# Patient Record
Sex: MALE | Race: WHITE | NOT HISPANIC OR LATINO | URBAN - NONMETROPOLITAN AREA
[De-identification: names, ages, dates, MRNs, and addresses within clinical notes are randomized per-mention and may not be internally consistent; named-entity substitution may affect disease eponyms.]

---

## 2022-11-22 RX ORDER — CETIRIZINE HYDROCHLORIDE 10 MG/1
10 TABLET ORAL DAILY
COMMUNITY

## 2022-11-22 RX ORDER — SODIUM PHOSPHATE,MONO-DIBASIC 19G-7G/118
ENEMA (ML) RECTAL
COMMUNITY

## 2022-11-22 RX ORDER — IBUPROFEN 800 MG/1
800 TABLET ORAL EVERY 6 HOURS PRN
COMMUNITY

## 2022-12-04 PROBLEM — Z80.0 FAMILY HISTORY OF COLON CANCER: Status: ACTIVE | Noted: 2022-12-04

## 2022-12-04 NOTE — PROGRESS NOTES
Chief Complaint   Patient presents with   • Colonoscopy     Screening colon had colon about 5 years ago in [place] father had colon cancer       PCP: Julien Burnette PA  REFER: Julien Burnette PA    Subjective     HPI    Jefe Burdick is a 52 y.o. male who presents to office for preventative maintenance.  There is not  a personal history of colon polyps.  There is not a history of colon cancer.  He does not have complaints of nausea/vomiting, change in bowels, weight loss, no BRBPR, no melena.  There is (father) a family history of colon cancer in first degree relative-diagnosis age of early 60s.  There is not a family history of colon polyps in first degree relative.  Jefe Burdick last colonoscopy-apx 5 years ago .  Bowels do move on regular basis.        No results for input(s): GLUCOSE, NA, K, CREATININE, BUN, BCR, ALKPHOS, ALT, AST, BILITOT, ALBUMIN in the last 64584 hours.    No results for input(s): EGFRIFNONA, EGFRIFAFRI, EGFRRESULT in the last 70081 hours.     Past Medical History:   Diagnosis Date   • Hyperlipidemia      History reviewed. No pertinent surgical history.  Outpatient Medications Marked as Taking for the 12/5/22 encounter (Office Visit) with Jesse Soriano APRN   Medication Sig Dispense Refill   • cetirizine (zyrTEC) 10 MG tablet Take 10 mg by mouth Daily.       No Known Allergies  Social History     Socioeconomic History   • Marital status:    Tobacco Use   • Smoking status: Never   • Smokeless tobacco: Never   Vaping Use   • Vaping Use: Never used   Substance and Sexual Activity   • Alcohol use: Yes     Comment: occ.   • Drug use: Never     Review of Systems   Constitutional: Negative for unexpected weight change.   Respiratory: Negative for shortness of breath.    Cardiovascular: Negative for chest pain.   Gastrointestinal: Negative for abdominal pain and anal bleeding.     Objective   Vitals:    12/05/22 1026   BP: 134/84   Pulse: 88   Temp: 97 °F (36.1 °C)    SpO2: 98%     Physical Exam  Constitutional:       Appearance: Normal appearance. He is well-developed.   Eyes:      General: No scleral icterus.  Cardiovascular:      Rate and Rhythm: Regular rhythm.      Heart sounds: Normal heart sounds. No murmur heard.  Pulmonary:      Effort: Pulmonary effort is normal. No accessory muscle usage.      Breath sounds: Normal breath sounds.   Abdominal:      General: Bowel sounds are normal. There is no distension.      Palpations: Abdomen is soft. There is no mass.      Tenderness: There is no abdominal tenderness. There is no guarding or rebound.   Skin:     General: Skin is warm and dry.      Coloration: Skin is not jaundiced.   Neurological:      Mental Status: He is alert.   Psychiatric:         Behavior: Behavior is cooperative.       Imaging Results (Most Recent)     None        Body mass index is 36.72 kg/m².    Assessment & Plan   Diagnoses and all orders for this visit:    1. Family history of colon cancer (Primary)  Comments:  father  Orders:  -     Case Request; Standing  -     Implement Anesthesia Orders Day of Procedure; Standing  -     Obtain Informed Consent; Standing  -     Case Request    Other orders  -     polyethylene glycol (MIRALAX) 17 GM/SCOOP powder; Take 238 g by mouth 1 (One) Time for 1 dose. Take as directed  Dispense: 238 g; Refill: 0      COLONOSCOPY WITH ANESTHESIA (N/A)        Advised pt to stop use of NSAIDs, Fish Oil, and MV 5 days prior to procedure, per Dr Fregoso protocol.  Tylenol based products are ok to take.  Pt verbalized understanding.     All risks, benefits, alternatives, and indications of colonoscopy procedure have been discussed with the patient. Risks to include perforation of the colon requiring possible surgery or colostomy, risk of bleeding from biopsies or removal of colon tissue, possibility of missing a colon polyp or cancer, or adverse drug reaction.  Benefits to include the diagnosis and management of disease of the colon  and rectum. Alternatives to include barium enema, radiographic evaluation, lab testing or no intervention. He verbalizes understanding and agrees.         Jesse Soriano, APRN  12/05/22        There are no Patient Instructions on file for this visit.

## 2022-12-05 ENCOUNTER — OFFICE VISIT (OUTPATIENT)
Dept: GASTROENTEROLOGY | Facility: CLINIC | Age: 52
End: 2022-12-05

## 2022-12-05 VITALS
HEART RATE: 88 BPM | HEIGHT: 74 IN | DIASTOLIC BLOOD PRESSURE: 84 MMHG | SYSTOLIC BLOOD PRESSURE: 134 MMHG | WEIGHT: 286 LBS | BODY MASS INDEX: 36.7 KG/M2 | TEMPERATURE: 97 F | OXYGEN SATURATION: 98 %

## 2022-12-05 DIAGNOSIS — Z80.0 FAMILY HISTORY OF COLON CANCER: Primary | ICD-10-CM

## 2022-12-05 PROCEDURE — S0260 H&P FOR SURGERY: HCPCS | Performed by: NURSE PRACTITIONER

## 2022-12-05 RX ORDER — POLYETHYLENE GLYCOL 3350 17 G/17G
238 POWDER, FOR SOLUTION ORAL ONCE
Qty: 238 G | Refills: 0 | Status: SHIPPED | OUTPATIENT
Start: 2022-12-05 | End: 2022-12-05

## 2023-01-27 ENCOUNTER — ANESTHESIA (OUTPATIENT)
Dept: GASTROENTEROLOGY | Facility: HOSPITAL | Age: 53
End: 2023-01-27
Payer: OTHER GOVERNMENT

## 2023-01-27 ENCOUNTER — HOSPITAL ENCOUNTER (OUTPATIENT)
Facility: HOSPITAL | Age: 53
Setting detail: HOSPITAL OUTPATIENT SURGERY
Discharge: HOME | End: 2023-01-27
Attending: INTERNAL MEDICINE | Admitting: INTERNAL MEDICINE
Payer: OTHER GOVERNMENT

## 2023-01-27 ENCOUNTER — ANESTHESIA EVENT (OUTPATIENT)
Dept: GASTROENTEROLOGY | Facility: HOSPITAL | Age: 53
End: 2023-01-27
Payer: OTHER GOVERNMENT

## 2023-01-27 VITALS
HEART RATE: 80 BPM | TEMPERATURE: 97.7 F | OXYGEN SATURATION: 96 % | BODY MASS INDEX: 35.94 KG/M2 | WEIGHT: 280 LBS | HEIGHT: 74 IN | RESPIRATION RATE: 16 BRPM | SYSTOLIC BLOOD PRESSURE: 140 MMHG | DIASTOLIC BLOOD PRESSURE: 90 MMHG

## 2023-01-27 DIAGNOSIS — Z80.0 FAMILY HISTORY OF COLON CANCER: ICD-10-CM

## 2023-01-27 PROCEDURE — 45378 DIAGNOSTIC COLONOSCOPY: CPT | Performed by: INTERNAL MEDICINE

## 2023-01-27 PROCEDURE — 25010000002 PROPOFOL 10 MG/ML EMULSION: Performed by: NURSE ANESTHETIST, CERTIFIED REGISTERED

## 2023-01-27 RX ORDER — LIDOCAINE HYDROCHLORIDE 10 MG/ML
0.5 INJECTION, SOLUTION EPIDURAL; INFILTRATION; INTRACAUDAL; PERINEURAL ONCE AS NEEDED
Status: DISCONTINUED | OUTPATIENT
Start: 2023-01-27 | End: 2023-01-27 | Stop reason: HOSPADM

## 2023-01-27 RX ORDER — PROPOFOL 10 MG/ML
VIAL (ML) INTRAVENOUS AS NEEDED
Status: DISCONTINUED | OUTPATIENT
Start: 2023-01-27 | End: 2023-01-27 | Stop reason: SURG

## 2023-01-27 RX ORDER — LIDOCAINE HYDROCHLORIDE 20 MG/ML
INJECTION, SOLUTION EPIDURAL; INFILTRATION; INTRACAUDAL; PERINEURAL AS NEEDED
Status: DISCONTINUED | OUTPATIENT
Start: 2023-01-27 | End: 2023-01-27 | Stop reason: SURG

## 2023-01-27 RX ORDER — SODIUM CHLORIDE 9 MG/ML
500 INJECTION, SOLUTION INTRAVENOUS CONTINUOUS PRN
Status: DISCONTINUED | OUTPATIENT
Start: 2023-01-27 | End: 2023-01-27 | Stop reason: HOSPADM

## 2023-01-27 RX ORDER — SODIUM CHLORIDE 0.9 % (FLUSH) 0.9 %
10 SYRINGE (ML) INJECTION AS NEEDED
Status: DISCONTINUED | OUTPATIENT
Start: 2023-01-27 | End: 2023-01-27 | Stop reason: HOSPADM

## 2023-01-27 RX ADMIN — LIDOCAINE HYDROCHLORIDE 50 MG: 20 INJECTION, SOLUTION EPIDURAL; INFILTRATION; INTRACAUDAL; PERINEURAL at 10:40

## 2023-01-27 RX ADMIN — PROPOFOL INJECTABLE EMULSION 250 MG: 10 INJECTION, EMULSION INTRAVENOUS at 10:40

## 2023-01-27 RX ADMIN — SODIUM CHLORIDE 500 ML: 9 INJECTION, SOLUTION INTRAVENOUS at 09:29

## 2023-01-27 NOTE — ANESTHESIA POSTPROCEDURE EVALUATION
"Patient: Jefe Burdick    Procedure Summary     Date: 01/27/23 Room / Location: [facility] ENDOSCOPY 5 / [facility] ENDOSCOPY    Anesthesia Start: 1037 Anesthesia Stop: 1055    Procedure: COLONOSCOPY WITH ANESTHESIA Diagnosis:       Family history of colon cancer      (Family history of colon cancer [Z80.0])    Surgeons: Donis Fregoso DO Provider: Pacheco Treviño CRNA    Anesthesia Type: MAC ASA Status: 2          Anesthesia Type: MAC    Vitals  Vitals Value Taken Time   /90 01/27/23 1057   Temp     Pulse 93 01/27/23 1058   Resp 18 01/27/23 1055   SpO2 96 % 01/27/23 1058   Vitals shown include unvalidated device data.        Post Anesthesia Care and Evaluation    Patient location during evaluation: PACU  Patient participation: complete - patient participated  Level of consciousness: awake and alert  Pain management: adequate    Airway patency: patent  Anesthetic complications: No anesthetic complications    Cardiovascular status: acceptable  Respiratory status: acceptable  Hydration status: acceptable    Comments: Blood pressure 145/87, pulse 94, temperature 97.7 °F (36.5 °C), temperature source Temporal, resp. rate 18, height 188 cm (74\"), weight 127 kg (280 lb), SpO2 96 %.    Pt discharged from PACU based on sarah score >8      "

## 2023-01-27 NOTE — ANESTHESIA PREPROCEDURE EVALUATION
Anesthesia Evaluation     Patient summary reviewed   no history of anesthetic complications:  NPO Solid Status: > 8 hours             Airway   Mallampati: II  TM distance: >3 FB  Neck ROM: full  No difficulty expected  Dental - normal exam     Pulmonary - negative pulmonary ROS   Cardiovascular - negative cardio ROS  Exercise tolerance: excellent (>7 METS)        Neuro/Psych- negative ROS  GI/Hepatic/Renal/Endo    (+) obesity,       Musculoskeletal (-) negative ROS    Abdominal    Substance History      OB/GYN          Other - negative ROS                       Anesthesia Plan    ASA 2     MAC     intravenous induction     Anesthetic plan, risks, benefits, and alternatives have been provided, discussed and informed consent has been obtained with: patient.        CODE STATUS:

## 2023-01-30 ENCOUNTER — TELEPHONE (OUTPATIENT)
Dept: GASTROENTEROLOGY | Facility: CLINIC | Age: 53
End: 2023-01-30
Payer: OTHER GOVERNMENT

## 2024-10-31 ENCOUNTER — TRANSCRIBE ORDERS (OUTPATIENT)
Dept: ADMINISTRATIVE | Facility: HOSPITAL | Age: 54
End: 2024-10-31
Payer: OTHER GOVERNMENT

## 2024-10-31 DIAGNOSIS — J30.9 ALLERGIC RHINITIS, UNSPECIFIED SEASONALITY, UNSPECIFIED TRIGGER: Primary | ICD-10-CM

## 2024-11-20 ENCOUNTER — HOSPITAL ENCOUNTER (OUTPATIENT)
Dept: GENERAL RADIOLOGY | Facility: HOSPITAL | Age: 54
Discharge: HOME | End: 2024-11-20
Payer: OTHER GOVERNMENT

## 2024-11-20 DIAGNOSIS — J30.9 ALLERGIC RHINITIS, UNSPECIFIED SEASONALITY, UNSPECIFIED TRIGGER: ICD-10-CM

## 2024-11-20 PROCEDURE — 73110 X-RAY EXAM OF WRIST: CPT

## 2024-11-20 PROCEDURE — 70150 X-RAY EXAM OF FACIAL BONES: CPT

## 2025-05-15 ENCOUNTER — OFFICE VISIT (OUTPATIENT)
Age: 55
End: 2025-05-15
Payer: OTHER GOVERNMENT

## 2025-05-15 VITALS — WEIGHT: 280 LBS | BODY MASS INDEX: 35.94 KG/M2 | HEIGHT: 74 IN

## 2025-05-15 DIAGNOSIS — M67.431 GANGLION CYST OF WRIST, RIGHT: Primary | ICD-10-CM

## 2025-05-15 RX ORDER — FLUTICASONE PROPIONATE 50 MCG
2 SPRAY, SUSPENSION (ML) NASAL DAILY
COMMUNITY

## 2025-05-15 NOTE — PROGRESS NOTES
[facility] Sports Medicine  Tommy King MD, PhD  Chano King PA-C    Chief Complaint:   Chief Complaint   Patient presents with    Right Wrist - Initial Evaluation     Patient presents today for right wrist pain and ganglion cyst. X-rays performed at [facility] on 11/20/2024. Pt states the ganglion cyst has been present for 6 or more months. Pt states it has continuously gotten bigger with time. Pt states there was no wrist pain prior to the cyst.           History of Present Illness:   The patient is a pleasant 54-year-old who presents with a mildly painful cyst involving the volar surface of his right wrist.  This been ongoing since around November 2024.  He states that symptoms come and go.  He does state that it seems to have improved more recently.  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  He had x-rays taken back in November which did not show any acute osseous abnormality and did not show any soft tissue findings.  He has not had any advanced imaging as of yet.  He states that it is smaller than it typically is today.    Referring Provider: Julien Burnette PA     Past Medical History:   Past Medical History:   Diagnosis Date    GERD (gastroesophageal reflux disease)     HL (hearing loss)     Hyperlipidemia         Past Surgical History:  Past Surgical History:   Procedure Laterality Date    ABDOMINAL SURGERY      infant    COLONOSCOPY      COLONOSCOPY N/A 1/27/2023    Procedure: COLONOSCOPY WITH ANESTHESIA;  Surgeon: Donis Fregoso DO;  Location: [facility] ENDOSCOPY;  Service: Gastroenterology;  Laterality: N/A;  Pre: Family history of colon cancer  Post: normal  Julien Burnette PA        Social History:   Social History     Socioeconomic History    Marital status:    Tobacco Use    Smoking status: Never    Smokeless tobacco: Never   Vaping Use    Vaping status: Never Used    Substance and Sexual Activity    Alcohol use: Yes     Comment: Only two drinks a month, rarely.    Drug use: Never    Sexual activity: Defer        Medications:  Current Outpatient Medications   Medication Instructions    cetirizine (ZYRTEC) 10 mg, Daily    fluticasone (FLONASE) 50 MCG/ACT nasal spray 2 sprays, Daily    glucosamine-chondroitin 500-400 MG capsule capsule 3 Times Daily With Meals    ibuprofen (ADVIL,MOTRIN) 800 mg, Every 6 Hours PRN    Probiotic Product (PROBIOTIC-10 PO) Take  by mouth.        Allergies:  No Known Allergies    Vital Signs:  There were no vitals filed for this visit.  Body mass index is 35.93 kg/m².     Review of Systems:   Review of Systems    Physical Exam:  Vital signs reviewed.   General: No acute distress. Cooperative with exam.   Eyes: conjunctiva clear; pupils equally round and reactive  ENT: external ears and nose atraumatic; oropharynx clear  CV: no peripheral edema, 2+ distal pulses  Resp: normal respiratory effort, no use of accessory muscles  Skin: no rashes or wounds; normal turgor  Psych: mood and affect appropriate; recent and remote memory intact  Neuro: sensation to light touch intact, no focal neurological deficit  Musculoskeletal: On inspection of the patient's right wrist, there is a small palpable, mobile, nontender cyst over the volar surface just superficial to the radial artery.  Pulses 2+, capillary refills less than 2.  He has intact range of motion at the wrist actively and passively that is pain-free.   strength is intact, 5 out of 5.  Neurovascular intact    Physical Exam     Results Review:   I have personally reviewed and interpreted an AP oblique and lateral of the patient's right wrist which did not demonstrate any acute osseous abnormality.  There is minimal degenerative change.  No significant soft tissue abnormalities are present.  No calcified soft tissue lesions are present.  Image quality is adequate    Assessment:   Diagnoses and all orders  for this visit:    1. Ganglion cyst of wrist, right (Primary)         Plan:  The patient has what appears to be a very small ganglion cyst just superficial to the radial artery on the volar surface of his right wrist.  We discussed primarily close observation and use of NSAIDs if it happens to increase in size.  We have also discussed the possibility of aspiration under ultrasound guidance given the proximity to the radial artery.  Finally we have also discussed the possibility of obtaining an MRI of the right wrist to ensure that this is in fact a ganglion cyst and may consider referral to orthopedic hand and wrist surgery if necessary in the future.  The patient is agreeable with this plan, all questions were answered.    Follow-Up:   No follow-ups on file.     Procedure:  Procedures       This document has been signed by VINH Rosado on May 15, 2025 09:16 CDT

## 2025-06-18 ENCOUNTER — OFFICE VISIT (OUTPATIENT)
Dept: OTOLARYNGOLOGY | Facility: CLINIC | Age: 55
End: 2025-06-18
Payer: OTHER GOVERNMENT

## 2025-06-18 VITALS
HEIGHT: 74 IN | WEIGHT: 280.6 LBS | BODY MASS INDEX: 36.01 KG/M2 | DIASTOLIC BLOOD PRESSURE: 90 MMHG | TEMPERATURE: 97.7 F | HEART RATE: 89 BPM | SYSTOLIC BLOOD PRESSURE: 168 MMHG

## 2025-06-18 DIAGNOSIS — J31.0 CHRONIC RHINITIS: Primary | ICD-10-CM

## 2025-06-18 DIAGNOSIS — J34.89 NASAL OBSTRUCTION: ICD-10-CM

## 2025-06-18 DIAGNOSIS — R44.8 FACIAL PRESSURE: ICD-10-CM

## 2025-06-18 DIAGNOSIS — J34.2 NASAL SEPTAL DEVIATION: ICD-10-CM

## 2025-06-18 RX ORDER — FLUTICASONE PROPIONATE 50 MCG
2 SPRAY, SUSPENSION (ML) NASAL DAILY
Qty: 16 G | Refills: 2 | Status: SHIPPED | OUTPATIENT
Start: 2025-06-18

## 2025-06-18 RX ORDER — AZELASTINE 1 MG/ML
2 SPRAY, METERED NASAL 2 TIMES DAILY
Qty: 30 ML | Refills: 2 | Status: SHIPPED | OUTPATIENT
Start: 2025-06-18

## 2025-06-18 NOTE — PROGRESS NOTES
"    DOLORES Patricio  W ENT [facility]SE [facility]  [address], [address]  [facility] [address]  Fax [phone]  Phone [phone]      Visit Type: NEW PATIENT   Chief Complaint   Patient presents with    Allergic Rhinitis     Inflammation of the nasal passage due to an allergic reaction: [facility] says that [facility] is causing him issues to his eyes and ears. The meds that he believes is causing these symptoms are Azelastine, Montelukast 10 mg, and Flonase since November.           HISTORY OBTAINED FROM: patient  Allergic Rhinitis    Jefe Burdick is a 54 y.o. male who presents for evaluation of sinus complaints. He states he has had issues for years. [facility]. He reports he has episodes of chronic rhinitis with nasal congestion and drainage. Has \"flair ups\" with increased congestion and drainage, believes they are sinus infections and has facial pressure, blurred vision and fatigue with episodes. States they occur every few months, seems to be triggered when he misses medication.   He is currently on Astelin, Singulair, and Flonase.   Does feel these help symptoms but complains that they dry him out.   He states he is usually not treated with antibiotics or steroids, states he usually just rides symptoms out and gradually improves.         Past Medical History:   Diagnosis Date    GERD (gastroesophageal reflux disease)     HL (hearing loss)     Hyperlipidemia        Past Surgical History:   Procedure Laterality Date    ABDOMINAL SURGERY      infant    COLONOSCOPY      COLONOSCOPY N/A 1/27/2023    Procedure: COLONOSCOPY WITH ANESTHESIA;  Surgeon: Donis Fregoso DO;  Location: [facility] ENDOSCOPY;  Service: Gastroenterology;  Laterality: N/A;  Pre: Family history of colon cancer  Post: normal  Julien Burnette PA       Family History: His family history includes Cancer in his father; Colon " cancer in his father; Diabetes in his father and mother; Heart disease in his father; Hyperlipidemia in his father; Hypertension in his father and mother.     Social History: He  reports that he has never smoked. He has never used smokeless tobacco. He reports current alcohol use. He reports that he does not use drugs.    Home Medications:  Probiotic Product, cetirizine, fluticasone, glucosamine-chondroitin, and ibuprofen    Allergies:  He has no known allergies.       Vital Signs:   Temp:  [97.7 °F (36.5 °C)] 97.7 °F (36.5 °C)  Heart Rate:  [89] 89  BP: (168)/(90) 168/90  ENT Physical Exam  Constitutional  Appearance: patient appears well-developed, well-nourished and well-groomed,  Communication/Voice: communication appropriate for developmental age; vocal quality normal;  Head and Face  Appearance: head appears normal, face appears normal and face appears atraumatic;  Palpation: maxillary sinus tenderness noted bilaterally; Sinus comments: mild tenderness  Salivary: glands normal;  Ear  Hearing: intact;  Auricles: bilateral auricles normal;  External Mastoids: bilateral external mastoids normal;  Ear Canals: bilateral ear canals normal;  Tympanic Membranes: bilateral tympanic membranes normal;  Nose  External Nose: nares patent bilaterally; external nose normal;  Internal Nose: nasal mucosa normal; bilateral inferior turbinates normal;  Oral Cavity/Oropharynx  Lips: normal;  Teeth: normal;  Gums: gingiva normal;  Tongue: normal; Benavides III position;  Oral mucosa: normal;  Hard palate: normal;  Soft palate: normal;  Tonsils: normal;  Base of Tongue: normal;  Posterior pharyngeal wall: normal;  Neck  Neck: neck normal; neck palpation normal;  Respiratory  Inspection: breathing unlabored; normal breathing rate;  Cardiovascular  Inspection: extremities are warm and well perfused;  Neurovestibular  Mental Status: alert and oriented;       Nasal Endoscopy    Date/Time: 6/18/2025 3:38 PM    Performed by: Sudarshan Rodas  DOLORES DOBBS  Authorized by: Sudarshan Rodas APRN    Consent:     Consent obtained:  Verbal    Consent given by:  Patient    Risks discussed:  Infection, nerve damage, bleeding and pain    Alternatives discussed:  No treatment, delayed treatment, observation and alternative treatment  Universal protocol:     Procedure explained and questions answered to patient or proxy's satisfaction: yes      Immediately prior to procedure, a time out was called: yes      Patient identity confirmed:  Verbally with patient  Anesthesia (see MAR for exact dosages):     Anesthesia method:  Topical application    Topical anesthetic:  Tetracaine  Procedure details:     Indications: sino-nasal symptoms      Medication:  Afrin    Instrument: flexible nasal nasal endoscope      Scope location: bilateral nare    Nasal cavity:     Right inferior turbinates: edema and enlarged      Left inferior turbinates: edema and enlarged    Septum:     Findings: inferior thickening      Deviation: deviated to the right and deviated to the left      Spurs: right and left      Spurs comment:  Inferior right spurring, mid left spurring  Sinus/ Nasopharynx:     Right middle meatus: normal and patent      Left middle meatus: obstruction       comment:  Anatomic obstruction due to septal deviation and turbinates    inflammation       comment:  Questionable small cyst to right side nasopharynx    inflammation      patent      inflammation      patent      inflammation    Post-procedure details:     Patient tolerance of procedure:  Tolerated well         Result Review       RESULTS REVIEW    I have reviewed the patients old records in the chart.   The following results/records were reviewed:     REFERRAL/PRE-AUTH MRN - SCAN - [facility] REF/AUTH_VINH PAL_05/08/25 (05/23/2025)     EXTERNAL MEDICAL RECORDS - SCAN - RECORD REQUEST, [facility], 5.22.2025 (05/22/2025)            Assessment & Plan  Chronic rhinitis    Orders:    Nasal endoscopy    CT Sinus Without  Contrast    Facial pressure    Orders:    Nasal endoscopy    CT Sinus Without Contrast    Nasal septal deviation    Orders:    Nasal endoscopy    CT Sinus Without Contrast    Nasal obstruction    Orders:    Nasal endoscopy    CT Sinus Without Contrast       Patient has notable septal deviation and turbinate enlargement, partial nasal obstruction especially notable to the left middle meatus.  This may be causing some of his symptoms.  Will pursue CT scan for further evaluation, likely will hold surgical discussion at follow-up.  Advised visual disturbances are usually not associated with sinus complaints.  I also advised patient to follow-up with his eye doctor and primary care provider for further evaluation of these complaints.    Medical and surgical options were discussed including observation, continued medical management, medication modification, surgical management, and CT scanning. Risks, benefits and alternatives were discussed and questions were answered. After considering the options, the patient decided to proceed with medication modification and CT scanning.  Saline nasal spray or saline gel to nose three times a day and as needed. Use a bedside humidifier at night. Place Vasoline in nose in the morning and at night.  Place Vasoline in nose in the morning and at night on the center part of the nose (septum) at least 15 minutes prior to the nasal spray. When using the nasal spray, aim towards the outer corner of the eye.   For the best response, use your nasal sprays every day without skipping doses. It may take several weeks before the full effect is acheived.   GENERAL ALLERGY AVOIDANCE: Regular vacuum cleaning is  recommended  to prevent accumulation of allergens. Use adequate filtration, including double thickness bags or HEPA filters on the  outlet. Use air-conditioning where possible. Change central air filters with an allergy rated filter on a regular basis. Install car pollen filters where  possible.   Obtain sinus CT, will call results  Increase Flonase to twice daily  Increase Astelin to twice daily   saline spray  Vaseline to nose  Bedside humidifier    Call with questions or concerns    Return in about 6 weeks (around 7/30/2025) for Recheck sinus complaints, review CT scan.        Electronically signed by DOLORES Patricio 06/18/25 4:25 PM CDT.

## 2025-07-10 ENCOUNTER — HOSPITAL ENCOUNTER (OUTPATIENT)
Dept: CT IMAGING | Facility: HOSPITAL | Age: 55
Discharge: HOME | End: 2025-07-10
Admitting: NURSE PRACTITIONER
Payer: OTHER GOVERNMENT

## 2025-07-10 PROCEDURE — 70486 CT MAXILLOFACIAL W/O DYE: CPT

## 2025-07-11 ENCOUNTER — RESULTS FOLLOW-UP (OUTPATIENT)
Dept: OTOLARYNGOLOGY | Facility: CLINIC | Age: 55
End: 2025-07-11
Payer: OTHER GOVERNMENT

## 2025-07-11 NOTE — TELEPHONE ENCOUNTER
Patient notified of CT findings per DOLORES Foote: Some mild chronic sinusitis in the left ethmoid, left maxillary and left frontal. Mild thickening of both ostiomeatal units but no significant obstruction. Mild deviation of the septum and some spurring bilaterally. Continue conservative treatment with nasal sprays for now, follow-up as scheduled and we will review further follow-up, plan for likely repeat nasal endoscopy.   Patient verbalized understanding.

## 2025-07-30 ENCOUNTER — OFFICE VISIT (OUTPATIENT)
Dept: OTOLARYNGOLOGY | Facility: CLINIC | Age: 55
End: 2025-07-30
Payer: OTHER GOVERNMENT

## 2025-07-30 VITALS
DIASTOLIC BLOOD PRESSURE: 91 MMHG | HEART RATE: 74 BPM | HEIGHT: 74 IN | TEMPERATURE: 98 F | BODY MASS INDEX: 36.06 KG/M2 | WEIGHT: 281 LBS | SYSTOLIC BLOOD PRESSURE: 164 MMHG

## 2025-07-30 DIAGNOSIS — J31.0 CHRONIC RHINITIS: Primary | ICD-10-CM

## 2025-07-30 DIAGNOSIS — R44.8 FACIAL PRESSURE: ICD-10-CM

## 2025-07-30 DIAGNOSIS — J34.2 NASAL SEPTAL DEVIATION: ICD-10-CM

## 2025-07-30 DIAGNOSIS — J34.89 NASAL OBSTRUCTION: ICD-10-CM

## 2025-07-30 NOTE — PROGRESS NOTES
"    DOLORES Patricio  [facility]  [address], [address]  [facility] [address]  Fax [phone]  Phone [phone]      Visit Type: FOLLOW UP   Chief Complaint   Patient presents with    Follow-up     Rechecking sinus complaints and reviewing CT report: Pt says he has been feeling much better since his last visit.           HISTORY OBTAINED FROM: patient  HPI  Jefe Burdick is a 55 y.o. male who presents for valuation, recheck sinonasal complaints. He states he has had issues for years. [facility] He reports he has episodes of chronic rhinitis with nasal congestion and drainage. Has \"flair ups\" with increased congestion and drainage, believes they are sinus infections and has facial pressure, blurred vision and fatigue with episodes. States they occur every few months, seems to be triggered when he misses medication.     Patient had CT scan of the sinuses after last visit, reviewed in office today.  He reports in general he has had good improvement in symptoms. He has been using Astelin and feels that this has helped his symptoms overall.  He reports no significant issues with congestion or drainage, feels he is breathing through his nose much better.  Denies notable facial pressure or other complaints today.      Past Medical History:   Diagnosis Date    GERD (gastroesophageal reflux disease)     HL (hearing loss)     Hyperlipidemia        Past Surgical History:   Procedure Laterality Date    ABDOMINAL SURGERY      infant    COLONOSCOPY      COLONOSCOPY N/A 1/27/2023    Procedure: COLONOSCOPY WITH ANESTHESIA;  Surgeon: Donis Fregoso DO;  Location: [facility] ENDOSCOPY;  Service: Gastroenterology;  Laterality: N/A;  Pre: Family history of colon cancer  Post: normal  Julien Burnette PA       Family History: His family history includes Cancer in his father; Colon cancer in his father; Diabetes in his father and " mother; Heart disease in his father; Hyperlipidemia in his father; Hypertension in his father and mother.     Social History: He  reports that he has never smoked. He has never used smokeless tobacco. He reports current alcohol use. He reports that he does not use drugs.    Home Medications:  Probiotic Product, azelastine, cetirizine, fluticasone, glucosamine-chondroitin, and ibuprofen    Allergies:  He has no known allergies.       Vital Signs:   Temp:  [98 °F (36.7 °C)] 98 °F (36.7 °C)  Heart Rate:  [74] 74  BP: (164)/(91) 164/91  ENT Physical Exam  Constitutional  Appearance: patient appears well-developed, well-nourished and well-groomed,  Communication/Voice: communication appropriate for developmental age; vocal quality normal;  Head and Face  Appearance: head appears normal, face appears normal and face appears atraumatic;  Palpation: maxillary sinus tenderness noted bilaterally; Sinus comments: mild tenderness  Salivary: glands normal;  Ear  Hearing: intact;  Auricles: bilateral auricles normal;  External Mastoids: bilateral external mastoids normal;  Ear Canals: bilateral ear canals normal;  Tympanic Membranes: bilateral tympanic membranes normal;  Nose  External Nose: nares patent bilaterally; external nose normal;  Internal Nose: nasal mucosa normal; bilateral inferior turbinates normal;  Oral Cavity/Oropharynx  Lips: normal;  Teeth: normal;  Gums: gingiva normal;  Tongue: normal; Benavides III position;  Oral mucosa: normal;  Hard palate: normal;  Soft palate: normal;  Tonsils: normal;  Base of Tongue: normal;  Posterior pharyngeal wall: normal;  Neck  Neck: neck normal; neck palpation normal;  Respiratory  Inspection: breathing unlabored; normal breathing rate;  Cardiovascular  Inspection: extremities are warm and well perfused;  Neurovestibular  Mental Status: alert and oriented;           Result Review       RESULTS REVIEW    I have reviewed the patients old records in the chart.   The following  results/records were reviewed:     CT Sinus Without Contrast (07/10/2025 10:25)     Review of bone windows demonstrates normal aeration of the mastoid air  cells, with asymmetry and evidence of mild mastoid sclerosis on the  right. There is mild mucosal thickening in the inferior left maxillary  sinus with retained mucus. The right maxillary sinus is normally  aerated. The sphenoid sinus is normally aerated. There are several  opacified ethmoid air cells on the left and one on the right. The right  frontal sinus is hypoplastic, and developed. Small amount of mucoid  debris is noted within the base of the left frontal sinus. No sinus  air-fluid levels are identified. There is mild deviation of the nasal  septum to the right. There is also a bony spur of the posterior left and  right anterior nasal septum. The ostiomeatal complexes appear  morphologically normal except for small bilateral Ashley cells. There is  mild mucosal thickening at the infundibula bilaterally. The orbits and  contents are within normal limits. The mandible and TMJs are  unremarkable. No skull abnormality. The visualized cervical spine is  unremarkable.     IMPRESSION:  1. Evidence of mild chronic sinusitis involving left ethmoid sinuses,  left maxillary sinus, base of the left frontal sinus and there is one  opacified right ethmoid air cells. There is also mild mucosal thickening  within the infundibula of both OMU's, without obstruction. No air-fluid  levels to indicate acute sinusitis.  2. Mild deviation of the nasal septum to the right and there are small  spurs involving the inferior nasal septum on the right and left  posteriorly. Small Ashley cells are present.        This report was signed and finalized on 7/10/2025 11:09 AM by Dr. William Ma MD.           Assessment & Plan  Chronic rhinitis         Facial pressure         Nasal septal deviation         Nasal obstruction            Reviewed CT scan in office with patient, reveals  somewhat S-shaped deviation of the septum with some rightward spurring as expected, mild inferior turbinate hypertrophy, some mild to moderate mucosal thickening in the left maxillary left frontal and left ethmoid air cells.  Ongoing treatment options discussed with patient including surgical intervention versus continued conservative measures or allergy testing.  Overall he reports he has improved fairly well and is happy with progress on nasal sprays.  He wishes to continue these for now.  I did discuss repeat nasal endoscopy today but he declines, states he feels much better and wishes to continue with current treatment plan.  May reconsider at follow-up.  Plan to recheck him again in about 3 to 4 months is moving to cold flu season and through the fall weather change.  He will call with any questions concerns or develop any symptoms.    Medical and surgical options were discussed including observation, continued medical management, medication modification, surgical management, and allergy testing. Risks, benefits and alternatives were discussed and questions were answered. After considering the options, the patient decided to proceed with continued medical management.  Continue nasal sprays as previously prescribed.  Saline nasal spray or saline gel to nose three times a day and as needed. Use a bedside humidifier at night. Place Vasoline in nose in the morning and at night.  Place Vasoline in nose in the morning and at night on the center part of the nose (septum) at least 15 minutes prior to the nasal spray. When using the nasal spray, aim towards the outer corner of the eye.   GENERAL ALLERGY AVOIDANCE: Regular vacuum cleaning is  recommended  to prevent accumulation of allergens. Use adequate filtration, including double thickness bags or HEPA filters on the  outlet. Use air-conditioning where possible. Change central air filters with an allergy rated filter on a regular basis. Install car pollen  filters where possible.   Continue Flonase  Continue Astelin  Saline spray      Return in about 3 months (around 10/30/2025) for Recheck sinonasal symptoms.        Electronically signed by DOLORES Patricio 07/30/25 9:22 AM CDT.

## (undated) DEVICE — SENSR O2 OXIMAX FNGR A/ 18IN NONSTR

## (undated) DEVICE — MASK,OXYGEN,MED CONC,ADLT,7' TUB, UC: Brand: PENDING

## (undated) DEVICE — Device: Brand: DEFENDO AIR/WATER/SUCTION AND BIOPSY VALVE

## (undated) DEVICE — THE CHANNEL CLEANING BRUSH IS A NYLON FLEXI BRUSH ATTACHED TO A FLEXIBLE PLASTIC SHEATH DESIGNED TO SAFELY REMOVE DEBRIS FROM FLEXIBLE ENDOSCOPES.

## (undated) DEVICE — YANKAUER,BULB TIP WITH VENT: Brand: ARGYLE